# Patient Record
Sex: MALE | URBAN - METROPOLITAN AREA
[De-identification: names, ages, dates, MRNs, and addresses within clinical notes are randomized per-mention and may not be internally consistent; named-entity substitution may affect disease eponyms.]

---

## 2021-03-27 ENCOUNTER — EMERGENCY (EMERGENCY)
Facility: HOSPITAL | Age: 31
LOS: 1 days | Discharge: HOME | End: 2021-03-27
Attending: STUDENT IN AN ORGANIZED HEALTH CARE EDUCATION/TRAINING PROGRAM
Payer: COMMERCIAL

## 2021-03-27 VITALS
SYSTOLIC BLOOD PRESSURE: 143 MMHG | HEART RATE: 88 BPM | RESPIRATION RATE: 18 BRPM | TEMPERATURE: 98 F | OXYGEN SATURATION: 100 % | DIASTOLIC BLOOD PRESSURE: 88 MMHG

## 2021-03-27 VITALS
DIASTOLIC BLOOD PRESSURE: 91 MMHG | HEART RATE: 82 BPM | SYSTOLIC BLOOD PRESSURE: 146 MMHG | TEMPERATURE: 98 F | OXYGEN SATURATION: 100 % | HEIGHT: 67.72 IN | WEIGHT: 255.74 LBS | RESPIRATION RATE: 18 BRPM

## 2021-03-27 PROCEDURE — 93010 ELECTROCARDIOGRAM REPORT: CPT

## 2021-03-27 PROCEDURE — 99284 EMERGENCY DEPT VISIT MOD MDM: CPT

## 2021-03-27 PROCEDURE — 99285 EMERGENCY DEPT VISIT HI MDM: CPT

## 2021-03-27 PROCEDURE — 93005 ELECTROCARDIOGRAM TRACING: CPT

## 2021-03-27 RX ADMIN — Medication 0.5 MILLIGRAM(S): at 16:48

## 2021-03-27 NOTE — ED ADULT NURSE NOTE - SUICIDE PROTECTIVE FACTORS
Responsibility to family and others/Supportive social network of family or friends/Engaged in work or school

## 2021-03-27 NOTE — ED PROVIDER NOTE - OBJECTIVE STATEMENT
Pt [place] 3 days ago when he had one drink of alcohol when he felt anxious, had to pull ove rnad let his mother drive home, states that since that time he has had recurrence of the episodes of the anxiety, he tried his brothers xanax which helped but made him sleepy, denies AVH / SI / HI, denies hx of episodes like this, no psychiatric hx, lives in [place] and is here visiting his brother, did receive his covid vaccine recently.

## 2021-03-27 NOTE — ED PROVIDER NOTE - NSFOLLOWUPINSTRUCTIONS_ED_ALL_ED_FT
Anxiety    Anxiety is not necessarily related to specific events or activities or is out of proportion to said events. Symptoms include restlessness, fatigue, difficulty concentrations, irritability and difficulty concentrating. It may interfere with life functions, including relationships, work, and school. If you were started on a medication, make sure to take exactly as prescribed and follow up with a psychiatrist.    Take ativan 1 tablet every 8 hours as needed for Panic attacks. Never take ativan with alcohol or opioid pain medications. Never drive while taking ativan.     SEEK IMMEDIATE MEDICAL CARE IF YOU HAVE ANY OF THE FOLLOWING SYMPTOMS: thoughts about hurting killing yourself, thoughts about hurting or killing somebody else, hallucinations, or worsening depression.

## 2021-03-27 NOTE — ED PROVIDER NOTE - PATIENT PORTAL LINK FT
You can access the FollowMyHealth Patient Portal offered by [facility] by registering at the following website: [email or web address]. By joining [facility] FollowMyHealth portal, you will also be able to view your health information using other applications (apps) compatible with our system.

## 2021-03-27 NOTE — ED PROVIDER NOTE - NSFOLLOWUPCLINICS_GEN_ALL_ED_FT
[facility]  Psychiatry  [id number] [place] [address]  Phone: [phone]  Fax:   Follow Up Time: 4-6 Days

## 2021-03-27 NOTE — ED PROVIDER NOTE - CLINICAL SUMMARY MEDICAL DECISION MAKING FREE TEXT BOX
30M hx of anxiety presenting for evaluation of anxiety intermittent for past three days no AVH/Hi/SI, on exam well appearing, ekg non-ischemic, will treat with benzo, prescribe short duration course, refer to outpatient psychiatry.

## 2021-03-27 NOTE — ED ADULT NURSE NOTE - OBJECTIVE STATEMENT
30 year old male BIB self with panic attack; A&O' denies GWEN; denies AVH; denies ETOH and drug use; denies Axis III. Pt states that he was at a casino with his mother Thursday and when driving home started to feel anxious, shaky, and feeling continued all day Friday and today, describing panic-like SXS; states he has no psychiatric HX and no prior TX, no prior HX of panic attacks; adjustment wise he is endorsing some covid related stress this past year but otherwise is in engaged in school, lives in [place] and is currently staying with brother, received vaccine recently; pt was very cooperative at first but became increasingly anxious and was reluctant to give up his electronic devices, wandning done but resident asked to assess pt to see if he requires a psych consult or not, CO initiated as of now; continue to monior.

## 2021-03-27 NOTE — ED PROVIDER NOTE - PHYSICAL EXAMINATION
Gen: NAD, non-toxic, conversational  Eyes: PERRLA, EOMI   HENT: Normocephalic, atraumatic. External ears normal, no rhinorrhea, moist mucous membranes.   CV: RRR, no M/R/G  Resp: CTAB, non-labored, speaking without difficulty on room air  Abd: soft, non tender, non rigid, no guarding or rebound tenderness  Back: No CVAT bilaterally, no midline ttp  Skin: dry, wwp   Neuro: AOx3, speech is fluent and appropriate  Psych: Mood anxious, affect euthymic